# Patient Record
Sex: MALE | Race: WHITE | NOT HISPANIC OR LATINO | URBAN - METROPOLITAN AREA
[De-identification: names, ages, dates, MRNs, and addresses within clinical notes are randomized per-mention and may not be internally consistent; named-entity substitution may affect disease eponyms.]

---

## 2017-02-08 ENCOUNTER — GENERIC CONVERSION - ENCOUNTER (OUTPATIENT)
Dept: OTHER | Facility: OTHER | Age: 62
End: 2017-02-08

## 2018-01-10 NOTE — RESULT NOTES
Message   Same dose    2 5mg Tue, Thur   5mg all other days   Recheck in 1 week  Verified Results  (1) PT WITH INR [id number] 06:34AM Thang Neely   REPORT COMMENT:  FASTING:NO     Test Name Result Flag Reference   INR 2 1 H    Reference Range                     0 9-1 1  Moderate-intensity Warfarin Therapy 2 0-3 0  Higher-intensity Warfarin Therapy   3 0-4 0   PT 21 1 sec H 9 0-11 5   For more information on this test, go to:  http://[employer]/faq/UAB913

## 2018-01-11 NOTE — RESULT NOTES
Message   New dose   2 5mg Thursday   5mg all other days   Recheck in 1 week        Verified Results  (1) PT WITH INR [id number] 11:14AM Thang Chroman     Test Name Result Flag Reference   INR 1 79 H 0 86-1 16   PT 20 1 seconds H 11 8-14 1

## 2018-01-11 NOTE — RESULT NOTES
Message   urine studies normal,  did he take dexamaethsone before the blood test ? if yes then the adenoma may be making too much steroid hormone as didnot suppress  to less than 1 8    repeat labs MORNING AND FASTING  , BMP, acth, am cortisol , renin ,aldosterone      Verified Results  (1) CORTISOL AM SPECIMEN [id number] 06:56AM Melinda Nunez   REPORT COMMENT:  SPLIT 2015 FROM [id number]     Test Name Result Flag Reference   CORTISOL, A M  3 5 mcg/dL L    Reference Range  8 a m  (7-9 a m ) Specimen: 4 0-22 0     (Q) CATECHOLAMINES FRAC AND CREATININE, 24 HOUR, U [id number] 06:46AM Melinda Nunez   REPORT COMMENT:  FASTING:NO  PATIENT REFUSED SOME TESTING; PATIENT ENCOURAGED TO RETURN  Test Name Result Flag Reference   24 HR URINE VOLUME 1800 mL     EPINEPHRINE, 24 HR URINE see note     Results are below the reportable range for this  analyte, which is 2 0 mcg/L    NOREPINEPHRINE, 24 HR UR 32 mcg/24 h     CALCULATED TOTAL (E+NE) 32 mcg/24 h     DOPAMINE, 24 HR URINE 111 mcg/24 h     CREATININE, 24 HOUR URINE 2 14 g/24 h  0 63-2 50     (Q) METANEPHRINES, FRACT  LC/MS/MS, 24 HR URINE [id number] 06:46AM Rebeccabrandy Chupa     Test Name Result Flag Reference   24 HR URINE VOLUME 1800 mL     METANEPHRINE 72 mcg/24 h L    NORMETANEPHRINE 313 mcg/24 h  122-676   METANEPHRINES, TOTAL 385 mcg/24 h  224-832   A four fold elevation of urinary normetanephrines  is extremely likely to be due to a tumor, while a  four fold elevation of urinary metanephrines is  highly suggestive, but not diagnostic of the tumor  Measurement of plasma Metanephrines and Chromogranin  A is recommended for confirmation  Plan  Adrenal mass    · (1) BASIC METABOLIC PROFILE; Status:Active; Requested [id number];    · (1) CORTISOL AM SPECIMEN; Status:Active; Requested XXB:[id number];    · (Q) ACTH, PLASMA, 2 SPECIMEN; Status:Active;  Requested [id number];    · (Q) ALDOSTERONE/PLASMA RENIN ACTIVITY RATIO,LC/MS/MS; Status:Active; Requested WWP:[id number];    · (Q) PLASMA RENIN ACTIVITY, LC/MS/MS; Status:Active;  Requested TTD:[id number];

## 2018-01-12 NOTE — RESULT NOTES
Message   a1c 6 9 at goal     Verified Results  (1) COMPREHENSIVE METABOLIC PANEL [id number] 91:20MP Nemesio Velazquez     Test Name Result Flag Reference   GLUCOSE 152 mg/dL H 65-99   Fasting reference interval   UREA NITROGEN (BUN) 31 mg/dL H 7-25   CREATININE 2 11 mg/dL H 0 70-1 33   For patients >52years of age, the reference limit  for Creatinine is approximately 13% higher for people  identified as -American  eGFR NON-AFR  AMERICAN 33 mL/min/1 73m2 L > OR = 60   eGFR AFRICAN AMERICAN 39 mL/min/1 73m2 L > OR = 60   BUN/CREATININE RATIO 15 (calc)  6-22   SODIUM 139 mmol/L  135-146   POTASSIUM 4 2 mmol/L  3 5-5 3   CHLORIDE 108 mmol/L     CARBON DIOXIDE 22 mmol/L  19-30   CALCIUM 9 1 mg/dL  8 6-10 3   PROTEIN, TOTAL 6 5 g/dL  6 1-8 1   ALBUMIN 3 6 g/dL  3 6-5 1   GLOBULIN 2 9 g/dL (calc)  1 9-3 7   ALBUMIN/GLOBULIN RATIO 1 2 (calc)  1 0-2 5   BILIRUBIN, TOTAL 0 6 mg/dL  0 2-1 2   ALKALINE PHOSPHATASE 67 U/L     AST 27 U/L  10-35   ALT 38 U/L  9-46     (Q) HEMOGLOBIN A1c 04Apr2016 06:51AM Nemesio Velazquez   REPORT COMMENT:  FASTING:YES     Test Name Result Flag Reference   HEMOGLOBIN A1c 6 9 % of total Hgb H <5 7   According to ADA guidelines, hemoglobin A1c <7 0%  represents optimal control in non-pregnant diabetic  patients  Different metrics may apply to specific  patient populations  Standards of Medical Care in    Diabetes Care  2013;36:s11-s66     For the purpose of screening for the presence of  diabetes  <5 7%       Consistent with the absence of diabetes  5 7-6 4%    Consistent with increased risk for diabetes              (prediabetes)  >or=6 5%    Consistent with diabetes     This assay result is consistent with diabetes  mellitus  Currently, no consensus exists for use of hemoglobin  A1c for diagnosis of diabetes for children

## 2018-01-12 NOTE — RESULT NOTES
Message   New dose   2 5mg Tue, Thursday and Sat   5mg all other days   Recheck in 1 week  Verified Results  (1) PT WITH INR [id number] 06:34AM Natalia Moore   REPORT COMMENT:  FASTING:UNKNOWN     Test Name Result Flag Reference   INR 3 3 H    Reference Range                     0 9-1 1  Moderate-intensity Warfarin Therapy 2 0-3 0  Higher-intensity Warfarin Therapy   3 0-4 0   PT 34 1 sec H 9 0-11 5   For more information on this test, go to:  http://education  [employer]/faq/LVP204

## 2018-01-13 NOTE — RESULT NOTES
Message   New dose   2 5mg Tue and Thursday   5mg all other days   Recheck in 1 week  Verified Results  (1) PT WITH INR 20Jan2016 06:37AM Justine Lin   REPORT COMMENT:  FASTING:YES     Test Name Result Flag Reference   INR 2 0 H    Reference Range                     0 9-1 1  Moderate-intensity Warfarin Therapy 2 0-3 0  Higher-intensity Warfarin Therapy   3 0-4 0   PT 20 8 sec H 9 0-11 5   For more information on this test, go to:  http://education  [employer]/faq/HQK349

## 2018-01-13 NOTE — RESULT NOTES
Message   Same dose   2 5mg Tue, Thur and Sat    5mg all other days    Please ask pt to make an appt to discuss coumadin this week  REcheck in 1 week  Verified Results  (1) PT WITH INR [id number] 06:47AM Karo [place]     Test Name Result Flag Reference   INR 2 8 H    Reference Range                     0 9-1 1  Moderate-intensity Warfarin Therapy 2 0-3 0  Higher-intensity Warfarin Therapy   3 0-4 0   PT 28 7 sec H 9 0-11 5   For more information on this test, go to:  http://education  [employer]/faq/QPL308

## 2018-01-13 NOTE — MISCELLANEOUS
History of Present Illness  TCM Communication [facility]: The patient is being contacted for follow-up after hospitalization  He was hospitalized at [facility]  The dates of hospitalization: 4 days, date of admission: 5/11/2016, date of discharge: 5/15/2016  Diagnosis: Patient underwent elective left adrenalectomy POD#1  He was discharged to home  Medications reviewed and updated today  He did not schedule a follow up appointment  He refused a follow up appointment due to will follow up with Dr Seble Lew  The patient is currently asymptomatic  Counseling was provided to patient's family  Patients wife  Topics counseled included importance of compliance with treatment  Communication performed and completed by A  [facility] LPN      Active Problems    1  Adrenal mass (255 9) (E27 9)   2  Alopecia areata (704 01) (L63 9)   3  Anemia (285 9) (D64 9)   4  Benign essential hypertension (401 1) (I10)   5  Blood in urine (599 70) (R31 9)   6  Chronic glomerulonephritis with other pathological lesion (582 89) (N03 9)   7  CKD (chronic kidney disease), stage III (585 3) (N18 3)   8  Diabetes mellitus type 2, uncontrolled, without complications (580 33) (E40 74)   9  DMII (diabetes mellitus, type 2) (250 00) (E11 9)   10  Edema (782 3) (R60 9)   11  Encounter for screening colonoscopy (V76 51) (Z12 11)   12  Esophageal reflux (530 81) (K21 9)   13  Furuncle (680 9) (L02 92)   14  Gross hematuria (599 71) (R31 0)   15  Hyperlipidemia (272 4) (E78 5)   16  Hyperparathyroidism (252 00) (E21 3)   17  Hypertensive kidney disease, benign (403 10,585 9) (I12 9,N18 9)   18  Hyperuricemia without signs inflammatory arthritis/tophaceous disease (790 6) (E79 0)   19  Need for prophylactic vaccination and inoculation against influenza (V04 81) (Z23)   20  Nicotine dependence (305 1) (F17 200)   21  Obesity (278 00) (E66 9)   22  Osteopenia (733 90) (M85 80)   23  Osteopenia (733 90) (M85 80)   24   Preop examination (V72 84) (Z01 818)   25  Pulmonary nodules (793 19) (R91 8)   26  Skin lesion (709 9) (L98 9)   27  Testicular hypogonadism (257 2) (E29 1)   28  Type 2 diabetes mellitus with hyperglycemia (250 00) (E11 65)   29  Well adult on routine health check (V70 0) (Z00 00)    Past Medical History    1  History of Acute venous embolism and thrombosis of deep vessels of distal lower   extremity (453 42) (I82 4Z9)   2  History of Acute venous embolism and thrombosis of deep vessels of distal lower   extremity (453 42) (I82 4Z9)   3  Anticoagulant long-term use (V58 61) (Z79 01)   4  History of Cellulitis (682 9) (L03 90)   5  History of Nephrotic Syndrome With Edema (581 9)   6  History of Proteinuria (791 0) (R80 9)   7  History of Renal Vein Thrombosis (453 3)   8  History of Vitamin D deficiency (268 9) (E55 9)    Surgical History    1  History of Interruption Inferior Vena Cava Toivola Filter Placement    Family History  Mother    1  Family history of Diabetes Mellitus (V18 0)   2  Family history of Heart Disease (V17 49)  Father    3  Family history of Cancer  Maternal Grandmother    4  Family history of Stroke Syndrome (V17 1)    Social History    · Denied: History of Alcohol Use (History)   · Being A Social Drinker   · Cigarette smoker (305 1) (F17 210)   · Current Every Day Smoker (305 1)   · Denied: History of Drug Use    Current Meds   1  Accu-Chek Jessica Plus In Vitro Strip; Check 4-6 times daily; Therapy: 64EDM0805 to (Evaluate:03Eve8360)  Requested for: 71UIQ3004; Last   Rx:12Mar2015 Ordered   2  Accu-Chek FastClix Lancets Miscellaneous; Test sugar 4 times daily as directed; Therapy: 91OOR1216 to (Evaluate:16Jun2016)  Requested for: 02YII9326; Last   Rx:22Jun2015 Ordered   3  Accu-Chek Softclix Lancets Miscellaneous; USE 4 TIMES DAILY; Therapy: 60GYI8098 to (Evaluate:06Jun2016)  Requested for: 12Jun2015; Last   Rx:12Jun2015 Ordered   4   BD Pen Needle Mini U/F 31G X 5 MM Miscellaneous; USE 5 NEEDLES DAILY; Therapy: 68WXR9893 to (Evaluate:10Mdb5826)  Requested for: 22VNC0362; Last   Rx:13Hmk5645 Ordered   5  Cardizem  MG Oral Capsule Extended Release 24 Hour; TAKE 1 CAPSULE Daily   Recorded   6  Diovan 320 MG Oral Tablet; Take 1 capsule twice daily Recorded   7  Fenofibric Acid 135 MG Oral Capsule Delayed Release; TAKE ONE (1) CAPSULE(S)   ONCE DAILY; Therapy: 98ICV9491 to (Evaluate:54Sus2691)  Requested for: 12DMI9681; Last   Rx:07Mar2016 Ordered   8  Flomax 0 4 MG Oral Capsule; take 1 capsule daily; Therapy: (Recorded:48Fxg7345) to Recorded   9  Lovaza 1 GM Oral Capsule; TAKE TWO (2) CAPSULE(S) TWICE DAILY; Therapy: 84EIA5286 to (Evaluate:21Otc3523)  Requested for: 01WLR5042; Last   Rx:09Mar2014 Ordered   10  NovoFine 30G X 8 MM Miscellaneous; USE 6 TIMES A DAY; Therapy: 79OXS7904 to (Evaluate:15Mar2016)  Requested for: 72Lql2271; Last    Rx:36Fum1806 Ordered   11  NovoLOG FlexPen 100 UNIT/ML Subcutaneous Solution Pen-injector; TAKE 40 UNITS    SUBCUTANEOUSL Y BEFORE BREAKFAST, 25 UNIT S BEFORE LUNCH AND SUPPER,    AND 20 UNITS WITHBEDTIME SNACK; Therapy: 54LPB4405 to ([employer] Fruits)  Requested for: 99CXD4006; Last    Rx:83Rzs5343 Ordered   12  Simvastatin 20 MG Oral Tablet; TAKE 1 TABLET DAILY IN THE EVENING; Therapy: 53WVB2418 to (Evaluate:92Xub7926)  Requested for: 99KNJ4001; Last    Rx:18Mar2016 Ordered   13  Sodium Bicarbonate 650 MG Oral Tablet Recorded   14  Toujeo SoloStar 300 UNIT/ML Subcutaneous Solution Pen-injector; 60 units sq qhs; Therapy: 26DUF1232 to (Evaluate:14Oct2016)  Requested for: 57FFD1225; Last    Rx:18Mar2016 Ordered    Allergies    1  Amoxicillin CAPS   2  Quinine Derivatives    3  Latex    Health Management  Encounter for screening colonoscopy   COLONOSCOPY; every 10 years; Next Due: 02Jun2015;  Overdue    Future Appointments    Date/Time Provider Specialty Site   09/08/2016 09:20 AM Sunday MD Rick Trammell [facility] pU   06/22/2016 08:40 KYLEE Gibbs  Endocrinology [facility] ENDOCRINOLOGY   05/19/2016 11:30 AM KYLEE Day  Urology [address] [place]   03/02/2017 08:20 AM David Blankenship DO Cardiology  CARDIOLOGY  [place]     Signatures   Electronically signed by :  Saturnino Schwartz MD; May 17 2016  9:27AM EST                       (Review)

## 2018-01-14 NOTE — RESULT NOTES
Message   Same dose   2 5mg Tuesday and Sat   5mg all other days   Recheck in 2 weeks  Verified Results  (1) PT WITH INR [id number] 06:35AM Krista Vera   REPORT COMMENT:  FASTING:NO     Test Name Result Flag Reference   INR 2 6 H    Reference Range                     0 9-1 1  Moderate-intensity Warfarin Therapy 2 0-3 0  Higher-intensity Warfarin Therapy   3 0-4 0   PT 26 8 sec H 9 0-11 5   For more information on this test, go to:  http://education  [employer]/faq/KME140

## 2018-01-15 NOTE — RESULT NOTES
Message   Same dose  2 5mg Tue and Sat   5mg all other days   Recheck in 1 month  Verified Results  (1) PT WITH INR [id number] 06:44AM Salina Cristino   REPORT COMMENT:  FASTING:NO     Test Name Result Flag Reference   INR 2 3 H    Reference Range                     0 9-1 1  Moderate-intensity Warfarin Therapy 2 0-3 0  Higher-intensity Warfarin Therapy   3 0-4 0   PT 23 2 sec H 9 0-11 5   For more information on this test, go to:  http://[employer]/faq/FWA928

## 2018-01-15 NOTE — RESULT NOTES
Message   New dose   2 5mg Tue and Thurs   5mg all other days   Recheck in 1 week  Verified Results  (1) PT WITH INR [id number] 06:34AM Kelsey Rising   REPORT COMMENT:  FASTING:NO     Test Name Result Flag Reference   INR 1 9 H    Reference Range                     0 9-1 1  Moderate-intensity Warfarin Therapy 2 0-3 0  Higher-intensity Warfarin Therapy   3 0-4 0   PT 19 7 sec H 9 0-11 5   For more information on this test, go to:  http://[employer]/faq/AND878

## 2018-01-15 NOTE — RESULT NOTES
Message   ok, discuss on f/u next month     Verified Results  (1) Thai [id number] 06:34AM Douglas Lynn     Test Name Result Flag Reference   GLUCOSE 116 mg/dL H 65-99   Fasting reference interval   UREA NITROGEN (BUN) 27 mg/dL H 7-25   CREATININE 1 95 mg/dL H 0 70-1 33   For patients >52years of age, the reference limit  for Creatinine is approximately 13% higher for people  identified as -American  eGFR NON-AFR  AMERICAN 37 mL/min/1 73m2 L > OR = 60   eGFR AFRICAN AMERICAN 43 mL/min/1 73m2 L > OR = 60   BUN/CREATININE RATIO 14 (calc)  6-22   SODIUM 138 mmol/L  135-146   POTASSIUM 4 6 mmol/L  3 5-5 3   CHLORIDE 108 mmol/L     CARBON DIOXIDE 21 mmol/L  19-30   CALCIUM 8 8 mg/dL  8 6-10 3     (Q) ACTH, PLASMA, 2 SPECIMEN 20Jan2016 06:34AM Douglas Lynn   REPORT COMMENT:  FASTING:YES     Test Name Result Flag Reference   SPECIMEN 1 18 pg/mL     TIME 1 11:27AM     SPECIMEN 2 19 pg/mL     TIME 2 15:14PM     ***Unable to flag abnormal result(s), please refer     to reference range(s) below:     Reference ranges:         <3 years:   Reference range not established       3-17 years:   9-57 pg/mL  > or = 18 years:   6-50 pg/mL     Reference range applies only to specimens  collected between 7-10am      (1) CORTISOL AM SPECIMEN 20Jan2016 06:34AM Douglas Lynn     Test Name Result Flag Reference   CORTISOL, A M  17 8 mcg/dL     Reference Range  8 a m  (7-9 a m ) Specimen: 4 0-22 0     (Q) ALDOSTERONE/PLASMA RENIN ACTIVITY RATIO,LC/MS/MS 20Jan2016 06:34AM Douglas Lynn     Test Name Result Flag Reference   PLASMA RENIN ACTIVITY,$LC/MS/MS 17 33 ng/mL/h H 0 25-5 82   ENRIQUE/PRA RATIO SEE NOTE Ratio  0 9-28 9   Unable to calculate  ALDOSTERONE is below the sensitivity of  this assay     ALDOSTERONE, LC/MS/MS <1 ng/dL     Adult Reference Ranges for Aldosterone,     LC/MS/MS:         Upright 8:00-10:00 am    < or = 28 ng/dL      Upright 4:00-6:00 pm     < or = 21 ng/dL      Supine  8:00-10:00 am 3-16 ng/dL

## 2018-01-15 NOTE — RESULT NOTES
Message   New dose   2 5mg Narinder Muñiz and Sat   5mg all other days   REcheck in 1 week  Verified Results  (1) PT WITH INR [id number] 07:03AM [facility]   REPORT COMMENT:  FASTING:NO     Test Name Result Flag Reference   INR 3 3 H    Reference Range                     0 9-1 1  Moderate-intensity Warfarin Therapy 2 0-3 0  Higher-intensity Warfarin Therapy   3 0-4 0   PT 34 0 sec H 9 0-11 5   For more information on this test, go to:  [email or web address]/faq/IIO251

## 2018-01-17 NOTE — PROGRESS NOTES
Preliminary Nursing Report                Patient Information    Initial Encounter Entry Date:   2016 1:35 PM EST (Automated Transmission Automated Transmission)       Last Modified:   {Claude Millard}              Legal Name: [id number] Number:        YOB: 1955        Age (years): 64        Gender: M        Body Mass Index (BMI): 35 kg/m2        Height: 70 in  Weight: 242 lbs (110 kgs)           Address:   [address] [place] [id number]               Phone: [phone]   (consent to leave messages)        Email:        Ethnicity: Decline to State        Roman Catholic:        Marital Status:        Preferred Language: English        Race: Other Race                    Patient Insurance Information        Primary Insurance Information Carrier Name: {Primary  CarrierName}           Carrier Address:   {Primary  CarrierAddress}              Carrier Phone: {Primary  CarrierPhone}          Group Number: {Primary  GroupNumber}          Policy Number: {Primary  PolicyNumber}          Insured Name: {Primary  InsuredName}          Insured : {Primary  InsuredDOB}          Relationship to Insured: {Primary  RelationshiptoInsured}           Secondary Insurance Information Carrier Name: {Secondary  CarrierName}           Carrier Address:   {Secondary  CarrierAddress}              Carrier Phone: {Secondary  CarrierPhone}          Group Number: {Secondary  GroupNumber}          Policy Number: {Secondary  PolicyNumber}          Insured Name: {Secondary  InsuredName}          Insured : {Secondary  InsuredDOB}          Relationship to Insured: {Secondary  RelationshiptoInsured}                       Health Profile   Booking #:   Negar Lamb #: [id number]               DOS: 2016    Surgery : LAPAROSCOPY, SURGICAL, WITH ADRENALECTOMY, PARTIAL OR COMPLETE, OR EXPLORATION OF ADRENAL GLAND WITH OR WITHOUT BIOPSY, TRANSABDOMINAL, LUMBAR OR DORSAL    Add'l Procedures/Notes:     Surgery Risk: Intermediate          Precautions     Chronic glomerulonephritis with other pathological lesion       Diabetes mellitus type 2, uncontrolled, without complications       DMII (diabetes mellitus, type 2)       Type 2 diabetes mellitus with hyperglycemia                   Allergies    Amoxicillin CAPS       Clinical Comments: Reaction Type: , Reaction: , Severity:        Latex       Quinine Derivatives             Medications    Cardizem  MG Oral Capsule Extended Release 24 Hour       Dexamethasone 1 MG Oral Tablet       Diovan 320 MG Oral Tablet       Fenofibric Acid 135 MG Oral Capsule Delayed Release       Lovaza 1 GM Oral Capsule       NovoLOG FlexPen 100 UNIT/ML Subcutaneous Solution Pen-injector       Simvastatin 20 MG Oral Tablet       Sodium Bicarbonate 650 MG Oral Tablet       Toujeo SoloStar 300 UNIT/ML Subcutaneous Solution Pen-injector       Warfarin Sodium 5 MG Oral Tablet               Conditions    Acute venous embolism and thrombosis of deep vessels of distal lower extremity       Adrenal mass       Alopecia areata       Anemia       Anticoagulant long-term use       Benign essential hypertension       Blood in urine       Chronic glomerulonephritis with other pathological lesion       Chronic kidney disease       Diabetes mellitus type 2, uncontrolled, without complications       DMII (diabetes mellitus, type 2)       Edema       Encounter for screening colonoscopy       Esophageal reflux       Essential hypertriglyceridemia       Furuncle       Gross hematuria       Hyperlipidemia       Hyperparathyroidism       Hypertension       Hypertensive kidney disease, benign       Hyperuricemia without signs inflammatory arthritis/tophaceous disease       Need for prophylactic vaccination and inoculation against influenza       Obesity       Osteopenia       Pulmonary nodule       Pulmonary nodules       Skin lesion       Testicular hypogonadism       Type 2 diabetes mellitus with hyperglycemia       Well adult on routine health check               Family History    None             Surgical History    None             Social History    Being A Social Drinker       Cigarette smoker       Current Every Day Smoker       Denies Alcohol Use (History)       Denies Drug Use                               Patient Instructions       ? NPO Instructions   The day before surgery it is recommended to have a light dinner at your usual time and you are allowed a light snack early in the evening  Do not eat anything heavy or eat a big meal after 7pm  Do not eat or drink anything after midnight prior to your surgery  If you are supposed to take any of your medications, do so with a sip of water  Failure to follow these instructions can lead to an increased risk of lung complications and may result in a delay or cancellation of your procedure  If you have any questions, contact your institution for further instructions  No candy, no gum, no mints, no chewing tobacco   Triggered by: Medical Procedure Risk         ? ACE/ARB (Blood Pressure Medication) 1  Please continue the following medications up to the evening before surgery, but do not take it on the day of surgery  Please restart your medications as soon as clinically feasible  Triggered by: Diovan 320 MG Oral Tablet         ? Calcium Blocker (Blood Pressure Medication) 3, 4, 6, 5, 2  Please continue to take this medication on your normal schedule  If this is an oral medication and you take in the morning, you may do so with a sip of water  Triggered by: Cardizem  MG Oral Capsule Extended Release 24 Hour         ? Cholesterol Medication 81, 82  Please continue to take this medication on your normal schedule  If this is an oral medication and you take in the morning, you may do so with a sip of water  Triggered by: Simvastatin 20 MG Oral Tablet         ?  Coumadin (Blood Thinners) 21, 22, 23  Please stop Coumadin for 5 days prior to surgery with prescribing physician consultation - bridging anticoagulation may be needed if you have an artificial heart valve  Triggered by: Warfarin Sodium 5 MG Oral Tablet         ? Diabetic Medication   Please decrease your morning insulin dose to one-half of normal dose  If you are taking oral diabetes medications, the morning dose should be omitted  If taking metformin (Glucophage), discontinue the medication for 24 hours prior to surgery  If you have an insulin pump, continue at a basal rate only  Triggered by: Type 2 diabetes mellitus with hyperglycemia, Diabetes mellitus type 2, uncontrolled, without complications, DMII (diabetes mellitus, type 2)         ? Reflux Disease   Please continue to take this medication on your normal schedule  If this is an oral medication and you take in the morning, you may do so with a sip of water  Triggered by: Esophageal reflux         ? Smoking Cessation   Smoking before and after surgery can lead to complications  Patients who quit smoking at least eight weeks before surgery have complication rates almost as low as non-smokers  Smokers who can stop smoking 24 or 48 hours before surgery may also benefit from decreased amounts of nicotine and carbon monoxide in the body  For help quitting smoking, speak with your physician or contact the [facility] or [employer]  Please visit the following web address for assistance with quitting  [employer]/Anesthesia-Topics/[email or web address]-Quit-Smoking  aspx  Triggered by: Current Every Day Smoker, Cigarette smoker         ? Steroids 83, 84, 102  Please continue your steroid medications up to and including the day of surgery (with a sip of water, if oral medication)  Triggered by: Dexamethasone 1 MG Oral Tablet               Testing Considerations       ?  Blood Glucose on Day of Surgery t  Please check the blood sugar on the morning of surgery  Triggered by: Type 2 diabetes mellitus with hyperglycemia, Diabetes mellitus type 2, uncontrolled, without complications, DMII (diabetes mellitus, type 2)         ? Coagulation Tests (PT/PTT/INR) t  Triggered by: Warfarin Sodium 5 MG Oral Tablet         ? Complete Blood Count (CBC) t, client, client  If test was completed and normal within last six months, repeat test is not necessary  Triggered by: Adrenal mass, Anemia, Chronic glomerulonephritis with other pathological lesion, Hyperparathyroidism, Age or Facility Rec         ? Comprehensive Metabolic Panel (CMP) t  If test was completed and normal within last six months, repeat test is not necessary  Triggered by: Chronic glomerulonephritis with other pathological lesion, Hyperparathyroidism         ? Consider Chest X-ray (CXR) t  Consider a Chest X-Ray (CXR) if patient is having respiratory symptoms  Triggered by: Pulmonary nodule         ? Consider Hemoglobin A1c (HbA1c) client  If test was completed and normal within the last three months, repeat test is not necessary  Triggered by: Type 2 diabetes mellitus with hyperglycemia, Diabetes mellitus type 2, uncontrolled, without complications, DMII (diabetes mellitus, type 2), Age or Facility Rec         ? Electrocardiogram (ECG) t  Patient does not need new test if normal ECG is present within the last six months and no change in clinical condition  Triggered by: Hypertensive kidney disease, benign, Hypertension, Benign essential hypertension, Type 2 diabetes mellitus with hyperglycemia, Diabetes mellitus type 2, uncontrolled, without complications, DMII (diabetes mellitus, type 2), Hyperparathyroidism         ? Serum Potassium (K) on the morning of surgery t  Triggered by: Chronic kidney disease         ? Type and Screen client  Type and Screen - Blood: If there is anticipated or possible large blood loss with this procedure, then a Type and Screen for Blood should be ordered  Triggered by: Age or Facility Rec         ?  Urinalysis t  Urinalysis may be appropriate if recent urinary symptoms or implants are being placed in surgical procedure  Triggered by: Chronic glomerulonephritis with other pathological lesion               Consultations       ? Primary Care Physician Evaluation   Primary care physician may need to evaluate patient prior to surgery  This is likely NOT necessary if the listed conditions are chronic and stable  Triggered by: Acute venous embolism and thrombosis of deep vessels of distal lower extremity, Adrenal mass, Anemia, Blood in urine, Chronic glomerulonephritis with other pathological lesion, Pulmonary nodule               Miscellaneous Questions         Question: Are you able to walk up a flight of stairs, walk up a hill or do heavy housework WITHOUT having chest pain or shortness of breath? Answer: YES                   Allergies/Conditions/Medications Not Found        The following were not recognized by our system when generating the recommendations  Please consider if this would impact any preoperative protocols  ? Anticoagulant long-term use       ? Being A Social Drinker       ? Denies Alcohol Use (History)       ? Denies Drug Use       ? Pulmonary nodules       ? Fenofibric Acid 135 MG Oral Capsule Delayed Release       ? NovoLOG FlexPen 100 UNIT/ML Subcutaneous Solution Pen-injector       ? Toujeo SoloStar 300 UNIT/ML Subcutaneous Solution Pen-injector                  Appointment Information         Date:    05/11/2016        Location:    [facility]        Address:           Directions:                      Footnotes revision 14      ?? Denotes a free-text entry  Legal Disclaimer: Any and all recommendations and services provided herein are designed to assist in the preoperative care of the patient  Nothing contained herein is designed to replace, eliminate or alleviate the responsibility of the attending physician to supervise and determine the patient?s preoperative care and course of treatment   Failure to provide complete, accurate information may negatively impact the system?s ability to recommend the proper preoperative protocol  THE ATTENDING PHYSICIAN IS RESPONSIBLE TO REVIEW THE SUGGESTED PREOPERATIVE PROTOCOLS/COURSE OF TREATMENT AND PRESCRIBE THE FINAL COURSE OF PREOPERATIVE TREATMENT IN CONSULTATION WITH THE PATIENT  THE ePRE[employer] SYSTEM AND ITS MATERIALS ARE PROVIDED ? AS IS? WITHOUT WARRANTY OF ANY KIND, EXPRESS OR IMPLIED, INCLUDING, BUT NOT LIMITED TO, WARRANTIES OF PERFORMANCE OR MERCHANTABILITY OR FITNESS FOR A PARTICULAR PURPOSE  PATIENT AND PHYSICIANS HEREBY AGREE THAT THEIR USE OF THE MATERIALS AND RESOURCES ACT AS A CONSENT TO RELEASE AND WAIVE e[employer] FROM ANY AND ALL CLAIMS OF WARRANTY, TORT OR CONTRACT LAW OF ANY KIND  Electronically signed Anjel ALLAN    Apr 18 2016 12:06PM EST